# Patient Record
Sex: FEMALE | Race: BLACK OR AFRICAN AMERICAN | NOT HISPANIC OR LATINO
[De-identification: names, ages, dates, MRNs, and addresses within clinical notes are randomized per-mention and may not be internally consistent; named-entity substitution may affect disease eponyms.]

---

## 2020-02-18 ENCOUNTER — APPOINTMENT (OUTPATIENT)
Dept: ORTHOPEDIC SURGERY | Facility: CLINIC | Age: 50
End: 2020-02-18
Payer: MEDICAID

## 2020-02-18 VITALS — HEIGHT: 64 IN | BODY MASS INDEX: 20.49 KG/M2 | WEIGHT: 120 LBS

## 2020-02-18 DIAGNOSIS — G89.29 PAIN IN RIGHT KNEE: ICD-10-CM

## 2020-02-18 DIAGNOSIS — M25.562 PAIN IN RIGHT KNEE: ICD-10-CM

## 2020-02-18 DIAGNOSIS — M25.561 PAIN IN RIGHT KNEE: ICD-10-CM

## 2020-02-18 DIAGNOSIS — M25.562 PAIN IN LEFT KNEE: ICD-10-CM

## 2020-02-18 DIAGNOSIS — M54.5 LOW BACK PAIN: ICD-10-CM

## 2020-02-18 DIAGNOSIS — G89.29 LOW BACK PAIN: ICD-10-CM

## 2020-02-18 PROBLEM — Z00.00 ENCOUNTER FOR PREVENTIVE HEALTH EXAMINATION: Status: ACTIVE | Noted: 2020-02-18

## 2020-02-18 PROCEDURE — 72120 X-RAY BEND ONLY L-S SPINE: CPT

## 2020-02-18 PROCEDURE — 99203 OFFICE O/P NEW LOW 30 MIN: CPT

## 2020-02-18 PROCEDURE — 73564 X-RAY EXAM KNEE 4 OR MORE: CPT | Mod: LT

## 2020-02-21 NOTE — DISCUSSION/SUMMARY
[de-identified] : This is a 49F w/ a left lumbar strain and b/l patellofemoral syndrome. Both issues will be managed nonoperatively with PT and NSAIDs PRN. Will FU PRN otherwise.

## 2020-02-21 NOTE — HISTORY OF PRESENT ILLNESS
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 In particular, she complains of b/l knee pain (R>L) which only occurs during deep flexion. 0/10 at rest and 9/10 with deep flexion. Pain is non radiating. Also complains of non radiating left lower back pain, similarly 9/10 with movement, otherwise 0/10. Has been tested negative for RA per patient. Has not tried any pain medication. The patient denies any recent fevers, unintentional weight loss or night sweats.

## 2020-02-21 NOTE — DATA REVIEWED
[de-identified] : XR LS spine (AP/Lat) 2/18/20: No fx's, dislocations or osseous lesions. Mild diffuse spondylosis. No listhesis. \par \par B/L Knee xrays (AP/Lat/Merc/Sunrise): No fx's, dislocations or osseous lesions. No significant DJD other than patellofemoral decreased joint space. (L>R)

## 2020-02-21 NOTE — PHYSICAL EXAM
[FreeTextEntry1] : General: well nourished, in no acute distress, alert and oriented to person, place and time.\par Psychiatric: normal mood and affect, no abnormal movements or speech patterns.\par Eyes: vision intact without deficits, sclera and conjunctiva were normal, pupils were equal in size. \par ENT: Ears and nose were normal in appearance. No thyromegaly.\par Lymph: no enlarged nodes, no lymphedema in extremity.\par Respiratory: Normal respiratory rhythm and effort. No wheezing detected without auscultation. No shortness of breath or respiratory distress.\par Cardiac: no cardiac related leg swelling, 2+ peripheral pulses.\par Neurology: normal gross sensation in extremities to light touch.\par Abdomen: soft, non-tender, tympanic, no masses.\par  \par Spine:\par Gait: ambulates fluidly without assistance. Able to heel and toe walk. \par Skin intact, no ecchymosis or cutaneous lesions. Overall alignment neutral.\par No TTP over spinous processes. +TTP over left lumbar paraspinal muscles. \par B/L L2-S1 5/5. \par B/L L2-S1 SILT.\par Negative SLR B/L\par Negative babinski \par No clonus or saddle anesthesia.\par Symmetric reflexes. \par \par BLE: \par \par Skin CDI. No effusion. No swelling or ecchymosis. ROM: 0-130 degrees w/ slight crepitus. Pain at hyperflexion. No varus/valgus instability. No joint line TTP. No TTP over quadriceps/patellar tendon. No TTP over tibial tubercle or pes insertion. No palpable masses. No lymphedema.\par Neg Lachman. Neg Nate's. \par Alignment: neutral\par EHL/FHL/GS/TA 5/5. S/S/SP/DP/T SILT. Toes warm, BCR. Compartments soft. \par \par Gait:\par The patient walks with a nonantalgic gait. Patient is able to toe and heel walk without difficulty, without use of assistive devices.

## 2021-02-12 ENCOUNTER — TRANSCRIPTION ENCOUNTER (OUTPATIENT)
Age: 51
End: 2021-02-12